# Patient Record
Sex: FEMALE | Race: BLACK OR AFRICAN AMERICAN | Employment: FULL TIME | URBAN - METROPOLITAN AREA
[De-identification: names, ages, dates, MRNs, and addresses within clinical notes are randomized per-mention and may not be internally consistent; named-entity substitution may affect disease eponyms.]

---

## 2017-03-28 ENCOUNTER — OFFICE VISIT (OUTPATIENT)
Dept: BEHAVIORAL/MENTAL HEALTH CLINIC | Age: 42
End: 2017-03-28

## 2017-03-28 VITALS
SYSTOLIC BLOOD PRESSURE: 135 MMHG | WEIGHT: 178 LBS | BODY MASS INDEX: 32.76 KG/M2 | HEART RATE: 82 BPM | HEIGHT: 62 IN | DIASTOLIC BLOOD PRESSURE: 85 MMHG

## 2017-03-28 DIAGNOSIS — F41.9 ANXIETY: ICD-10-CM

## 2017-03-28 DIAGNOSIS — F41.1 GENERALIZED ANXIETY DISORDER: Primary | ICD-10-CM

## 2017-03-28 RX ORDER — SERTRALINE HYDROCHLORIDE 50 MG/1
50 TABLET, FILM COATED ORAL DAILY
Qty: 30 TAB | Refills: 5 | Status: SHIPPED | OUTPATIENT
Start: 2017-03-28 | End: 2017-09-27 | Stop reason: SDUPTHER

## 2017-03-28 RX ORDER — PHENTERMINE HYDROCHLORIDE 37.5 MG/1
37.5 CAPSULE ORAL
COMMUNITY
End: 2018-03-27

## 2017-03-28 NOTE — PROGRESS NOTES
Psychiatric Outpatient Progress Note    Date: 3/28/2017  Account Number:  [id number]  Name: Kimmie Olson    Length of psychotherapy session: 20 minutes       SUBJECTIVE:   Kimmie Olson  is a 39 y.o.  female  patient presents for a therapy/psychopharmacological management appointment. She presents today without complaint and she denies any problems with the Zoloft. She is currently taking Phenteramine and Topamax for weight loss. She is looking forward to a trip to [facility] with her  and kids this spring. PFSHx: no work or familial challenges. ROS:   Appetite:good , Sleep: good ; A review of neurological, orthopedic, HEENT, Constitutional systems were within normal limits.     Response to Treatment: stable  Side Effects: none      Supportive/Cognitive/Reality-Oriented psychotherapy provided in regards to psychosocial stressors:   Current problems   Housing issues   Occupational issues- none   Academic issues   Legal issues   Medical issues   Interpersonal conflicts- none  Psychoeducation provided  Treatment plan reviewed with patient-including diagnosis and medications  Worked on issues of denial & effects of substance dependency/use      OBJECTIVE:                 Mental Status exam: WNL except for      Sensorium  oriented to time, place and person   Relations cooperative    Eye Contact    appropriate   Appearance:  age appropriate and casually dressed   Motor Behavior:  gait stable   Speech:  normal pitch and normal volume   Thought Process: within normal limits   Thought Content free of delusions and free of hallucinations   Suicidal ideations none   Homicidal ideations none   Mood:  euthymic   Affect:  euthymic   Memory recent  adequate   Memory remote:  adequate   Concentration:  adequate   Abstraction:  abstract   Insight:  good   Reliability good   Judgment:  good       Allergies   Allergen Reactions    Aspirin Swelling     Lip swelling, itching around mouth    Nsaids (Non-Steroidal Anti-Inflammatory Drug) Itching and Swelling        Current Outpatient Prescriptions   Medication Sig Dispense Refill    TOPIRAMATE (TOPAMAX PO) Take  by mouth.  phentermine 37.5 mg capsule Take 37.5 mg by mouth every morning.  sertraline (ZOLOFT) 50 mg tablet Take 1 Tab by mouth daily. Indications: GENERALIZED ANXIETY DISORDER, major depressive disorder 30 Tab 5    fluticasone (FLONASE) 50 mcg/actuation nasal spray 2 Sprays by Both Nostrils route daily.  cetirizine (ALLER-NANY) 10 mg tablet Take 10 mg by mouth daily. MEDICAL DECISION MAKING    Data: pertinent labs, imaging, medical records and diagnostic tests reviewed and incorporated in diagnosis and treatment plan    Diagnoses/ Problems:   1. Generalized anxiety disorder    Improved, stable  2. Insomnia   Improved, stable  Assessment:  Sue Willams  is a 39 y.o.  female patient presents with stable generalized anxiety disorder and insomnia. Risk Scoring- chronic illnesses and prescription drug management    Treatment Plan:  1. Medications:   Continue Zoloft 50mg    Orders Placed This Encounter    TOPIRAMATE (TOPAMAX PO)    phentermine 37.5 mg capsule    sertraline (ZOLOFT) 50 mg tablet           The following regarding medications was addressed:    (The risks and benefits of the proposed medication; the potential medication side effects ie    dry mouth, weight gain, GI upset, headache; patient given opportunity to ask questions)       Medication Changes/Adjustments:   Medications Discontinued During This Encounter   Medication Reason    VICTOZA 3-KOLE 0.6 mg/0.1 mL (18 mg/3 mL) sub-q pen Not A Current Medication    sertraline (ZOLOFT) 50 mg tablet Reorder      2. Provided supportive psychotherapy: addressed safety risk, recent stressors, provided validation and assisted with coping skills and problem solving  3.   Advised patient of elevated blood pressure, recc checking her bp at least twice a day for 1-2 weeks and keep a record. Advised to lose weight also  4.   Follow-up Disposition: Not on File

## 2017-09-27 ENCOUNTER — OFFICE VISIT (OUTPATIENT)
Dept: BEHAVIORAL/MENTAL HEALTH CLINIC | Age: 42
End: 2017-09-27

## 2017-09-27 VITALS
HEIGHT: 62 IN | WEIGHT: 166 LBS | BODY MASS INDEX: 30.55 KG/M2 | SYSTOLIC BLOOD PRESSURE: 120 MMHG | DIASTOLIC BLOOD PRESSURE: 84 MMHG | HEART RATE: 89 BPM

## 2017-09-27 DIAGNOSIS — F41.9 ANXIETY: ICD-10-CM

## 2017-09-27 DIAGNOSIS — F41.1 GENERALIZED ANXIETY DISORDER: Primary | ICD-10-CM

## 2017-09-27 RX ORDER — SERTRALINE HYDROCHLORIDE 50 MG/1
50 TABLET, FILM COATED ORAL DAILY
Qty: 30 TAB | Refills: 5 | Status: SHIPPED | OUTPATIENT
Start: 2017-09-27 | End: 2018-03-27 | Stop reason: SDUPTHER

## 2017-09-27 RX ORDER — SERTRALINE HYDROCHLORIDE 50 MG/1
50 TABLET, FILM COATED ORAL DAILY
Qty: 30 TAB | Refills: 5 | Status: SHIPPED | OUTPATIENT
Start: 2017-09-27 | End: 2017-09-27 | Stop reason: SDUPTHER

## 2017-09-27 NOTE — PROGRESS NOTES
Psychiatric Outpatient Progress Note    Date: 9/27/2017  Account Number:  [id number]  Name: Jake Valenzuela    Length of psychotherapy session: 20 minutes       SUBJECTIVE:   Jake Valenzuela  is a 39 y.o.  female  patient presents for a therapy/psychopharmacological management appointment. She reports that her mood has been stable, no significant anxiety or depression. She presents today without complaint and she denies any problems with the Zoloft. She is currently taking Phenteramine and Topamax for weight loss with limited success. Her doctor has recommended increased physical activity. Work and marriage, stable and fulfilling. PFSHx: no work or familial challenges. ROS:   Appetite:good , Sleep: good ; A review of neurological, orthopedic, HEENT, Constitutional systems were within normal limits.     Response to Treatment: stable  Side Effects: none      Supportive/Cognitive/Reality-Oriented psychotherapy provided in regards to psychosocial stressors:   Current problems   Housing issues   Occupational issues- none   Academic issues   Legal issues   Medical issues   Interpersonal conflicts- none  Psychoeducation provided  Treatment plan reviewed with patient-including diagnosis and medications  Worked on issues of denial & effects of substance dependency/use      OBJECTIVE:                 Mental Status exam: WNL except for      Sensorium  oriented to time, place and person   Relations cooperative    Eye Contact    appropriate   Appearance:  age appropriate and casually dressed   Motor Behavior:  gait stable   Speech:  normal pitch and normal volume   Thought Process: within normal limits   Thought Content free of delusions and free of hallucinations   Suicidal ideations none   Homicidal ideations none   Mood:  euthymic   Affect:  euthymic   Memory recent  adequate   Memory remote:  adequate   Concentration:  adequate   Abstraction:  abstract   Insight:  good   Reliability good   Judgment:  good Allergies   Allergen Reactions    Aspirin Swelling     Lip swelling, itching around mouth    Nsaids (Non-Steroidal Anti-Inflammatory Drug) Itching and Swelling        Current Outpatient Prescriptions   Medication Sig Dispense Refill    TOPIRAMATE (TOPAMAX PO) Take  by mouth.  phentermine 37.5 mg capsule Take 37.5 mg by mouth every morning.  sertraline (ZOLOFT) 50 mg tablet Take 1 Tab by mouth daily. Indications: GENERALIZED ANXIETY DISORDER, major depressive disorder 30 Tab 5    fluticasone (FLONASE) 50 mcg/actuation nasal spray 2 Sprays by Both Nostrils route daily.  cetirizine (ALLER-NANY) 10 mg tablet Take 10 mg by mouth daily. MEDICAL DECISION MAKING    Data: pertinent labs, imaging, medical records and diagnostic tests reviewed and incorporated in diagnosis and treatment plan    Diagnoses/ Problems:   1. Generalized anxiety disorder    Improved, stable  2. Insomnia   Improved, stable  Assessment:  Megha Rangel  is a 39 y.o.  female patient presents with stable generalized anxiety disorder and insomnia. Risk Scoring- chronic illnesses and prescription drug management    Treatment Plan:  1. Medications:   Continue Zoloft 50mg    No orders of the defined types were placed in this encounter. The following regarding medications was addressed:    (The risks and benefits of the proposed medication; the potential medication side effects ie    dry mouth, weight gain, GI upset, headache; patient given opportunity to ask questions)       Medication Changes/Adjustments: There are no discontinued medications. 2.  Provided supportive psychotherapy: addressed safety risk, recent stressors, provided validation and assisted with coping skills and problem solving  3. Advised patient of elevated blood pressure, recc checking her bp at least twice a day for 1-2 weeks and keep a record. Advised to lose weight also  4.   Follow-up Disposition: Not on File

## 2018-03-27 ENCOUNTER — OFFICE VISIT (OUTPATIENT)
Dept: BEHAVIORAL/MENTAL HEALTH CLINIC | Age: 43
End: 2018-03-27

## 2018-03-27 DIAGNOSIS — F41.9 ANXIETY: ICD-10-CM

## 2018-03-27 DIAGNOSIS — F41.1 GENERALIZED ANXIETY DISORDER: Primary | ICD-10-CM

## 2018-03-27 RX ORDER — SERTRALINE HYDROCHLORIDE 50 MG/1
50 TABLET, FILM COATED ORAL DAILY
Qty: 30 TAB | Refills: 5 | Status: SHIPPED | OUTPATIENT
Start: 2018-03-27 | End: 2018-09-26 | Stop reason: SDUPTHER

## 2018-03-27 NOTE — MR AVS SNAPSHOT
[address] [facility] [address] [place] [address] 
[phone] Patient: Abdon Hanley MRN: [id number] :1975 Visit Information Date & Time Provider Department Dept. Phone Encounter #  
 3/27/2018  2:00 PM Francine Veloz MD [id number] [facility] [phone] [id number] Upcoming Health Maintenance Date Due DTaP/Tdap/Td series (1 - Tdap) 1996 PAP AKA CERVICAL CYTOLOGY 1996 Influenza Age 5 to Adult 2017 Allergies as of 3/27/2018  Review Complete On: 2017 By: Rosie Rivas Severity Noted Reaction Type Reactions Aspirin  2013    Swelling Lip swelling, itching around mouth Nsaids (Non-steroidal Anti-inflammatory Drug)  2015    Itching, Swelling Current Immunizations  Never Reviewed No immunizations on file. Not reviewed this visit You Were Diagnosed With   
  
 Codes Comments Generalized anxiety disorder    -  Primary ICD-10-CM: F41.1 ICD-9-CM: 300.02 Anxiety     ICD-10-CM: F41.9 ICD-9-CM: 300.00 Vitals OB Status Smoking Status Having regular periods Never Smoker Preferred Pharmacy Pharmacy Name Phone [address], [facility] [address] Your Updated Medication List  
  
   
This list is accurate as of 3/27/18  2:30 PM.  Always use your most recent med list.  
  
  
  
  
 ALLER-NANY 10 mg tablet Generic drug:  cetirizine Take 10 mg by mouth daily. FLONASE 50 mcg/actuation nasal spray Generic drug:  fluticasone 2 Sprays by Both Nostrils route daily. sertraline 50 mg tablet Commonly known as:  ZOLOFT Take 1 Tab by mouth daily. Indications: Generalized Anxiety Disorder, major depressive disorder TOPAMAX PO Take  by mouth. Prescriptions Sent to Pharmacy Refills sertraline (ZOLOFT) 50 mg tablet 5 Sig: Take 1 Tab by mouth daily. Indications: Generalized Anxiety Disorder, major depressive disorder Class: Normal  
 Pharmacy: [address] [facility], [facility] [address] [facility] #: [phone] Route: Oral  
  
Introducing [facility]! Sylvie Davis introduces [employer] patient portal. Now you can access parts of your medical record, email your doctor's office, and request medication refills online. 1. In your internet browser, go to [email or web address] 2. Click on the First Time User? Click Here link in the Sign In box. You will see the New Member Sign Up page. 3. Enter your [employer] Access Code exactly as it appears below. You will not need to use this code after youve completed the sign-up process. If you do not sign up before the expiration date, you must request a new code. · [employer] Access Code: [email or web address] Expires: 6/25/2018  2:30 PM 
 
4. Enter the last four digits of your Social Security Number (xxxx) and Date of Birth (mm/dd/yyyy) as indicated and click Submit. You will be taken to the next sign-up page. 5. Create a [employer] ID. This will be your [employer] login ID and cannot be changed, so think of one that is secure and easy to remember. 6. Create a [employer] password. You can change your password at any time. 7. Enter your Password Reset Question and Answer. This can be used at a later time if you forget your password. 8. Enter your e-mail address. You will receive e-mail notification when new information is available in [address] 9. Click Sign Up. You can now view and download portions of your medical record. 10. Click the Download Summary menu link to download a portable copy of your medical information. If you have questions, please visit the Frequently Asked Questions section of the [employer] website.  Remember, [employer] is NOT to be used for urgent needs. For medical emergencies, dial 911. Now available from your iPhone and Android! Please provide this summary of care documentation to your next provider. Your primary care clinician is listed as [address]. If you have any questions after today's visit, please call [phone].

## 2018-03-27 NOTE — PROGRESS NOTES
Psychiatric Outpatient Progress Note    Date: 3/27/2018  Account Number:  [id number]  Name: Lana Paniagua    Length of psychotherapy session: 20 minutes       SUBJECTIVE:   Lana Paniagua  is a 43 y.o.  female  patient presents for a therapy/psychopharmacological management appointment. She reports that her mood has been stable, no significant anxiety or depression. She presents today without complaint and she denies any problems with the Zoloft. She is currently taking  Topamax for weight loss with limited success, no longer taking phenteramine. Work and marriage, stable and fulfilling. She is looking forward to summer vacation. PFSHx: no work or familial challenges. ROS:   Appetite:good , Sleep: good ; A review of neurological, orthopedic, HEENT, Constitutional systems were within normal limits.     Response to Treatment: stable  Side Effects: none      Supportive/Cognitive/Reality-Oriented psychotherapy provided in regards to psychosocial stressors:   Current problems   Housing issues   Occupational issues- none   Academic issues   Legal issues   Medical issues   Interpersonal conflicts- none  Psychoeducation provided  Treatment plan reviewed with patient-including diagnosis and medications  Worked on issues of denial & effects of substance dependency/use      OBJECTIVE:                 Mental Status exam: WNL except for      Sensorium  oriented to time, place and person   Relations cooperative    Eye Contact    appropriate   Appearance:  age appropriate and casually dressed   Motor Behavior:  gait stable   Speech:  normal pitch and normal volume   Thought Process: within normal limits   Thought Content free of delusions and free of hallucinations   Suicidal ideations none   Homicidal ideations none   Mood:  euthymic   Affect:  euthymic   Memory recent  adequate   Memory remote:  adequate   Concentration:  adequate   Abstraction:  abstract   Insight:  good   Reliability good   Judgment:  good Allergies   Allergen Reactions    Aspirin Swelling     Lip swelling, itching around mouth    Nsaids (Non-Steroidal Anti-Inflammatory Drug) Itching and Swelling        Current Outpatient Prescriptions   Medication Sig Dispense Refill    sertraline (ZOLOFT) 50 mg tablet Take 1 Tab by mouth daily. Indications: Generalized Anxiety Disorder, major depressive disorder 30 Tab 5    TOPIRAMATE (TOPAMAX PO) Take  by mouth.  fluticasone (FLONASE) 50 mcg/actuation nasal spray 2 Sprays by Both Nostrils route daily.  cetirizine (ALLER-NANY) 10 mg tablet Take 10 mg by mouth daily. MEDICAL DECISION MAKING    Data: pertinent labs, imaging, medical records and diagnostic tests reviewed and incorporated in diagnosis and treatment plan    Diagnoses/ Problems:   1. Generalized anxiety disorder    Improved, stable  2. Insomnia   Improved, stable  Assessment:  Ara Bey  is a 43 y.o.  female patient presents with stable generalized anxiety disorder and insomnia. Risk Scoring- chronic illnesses and prescription drug management    Treatment Plan:  1. Medications:   Continue Zoloft 50mg    Orders Placed This Encounter    sertraline (ZOLOFT) 50 mg tablet           The following regarding medications was addressed:    (The risks and benefits of the proposed medication; the potential medication side effects ie    dry mouth, weight gain, GI upset, headache; patient given opportunity to ask questions)       Medication Changes/Adjustments:   Medications Discontinued During This Encounter   Medication Reason    phentermine 37.5 mg capsule Not A Current Medication    sertraline (ZOLOFT) 50 mg tablet Reorder      2. Provided supportive psychotherapy: addressed safety risk, recent stressors, provided validation and assisted with coping skills and problem solving  3. Advised patient of elevated blood pressure, recc checking her bp at least twice a day for 1-2 weeks and keep a record.  Advised to lose weight also  4. Follow-up Disposition:  Return in about 6 months (around 9/27/2018).

## 2018-09-26 ENCOUNTER — OFFICE VISIT (OUTPATIENT)
Dept: BEHAVIORAL/MENTAL HEALTH CLINIC | Age: 43
End: 2018-09-26

## 2018-09-26 VITALS
HEART RATE: 86 BPM | BODY MASS INDEX: 28.52 KG/M2 | SYSTOLIC BLOOD PRESSURE: 137 MMHG | DIASTOLIC BLOOD PRESSURE: 88 MMHG | HEIGHT: 62 IN | WEIGHT: 155 LBS

## 2018-09-26 DIAGNOSIS — F41.1 GENERALIZED ANXIETY DISORDER: Primary | ICD-10-CM

## 2018-09-26 DIAGNOSIS — F41.9 ANXIETY: ICD-10-CM

## 2018-09-26 RX ORDER — SERTRALINE HYDROCHLORIDE 50 MG/1
50 TABLET, FILM COATED ORAL DAILY
Qty: 30 TAB | Refills: 5 | Status: SHIPPED | OUTPATIENT
Start: 2018-09-26 | End: 2019-01-09 | Stop reason: SDUPTHER

## 2018-09-28 NOTE — PROGRESS NOTES
Psychiatric Outpatient Progress Note    Date: 9/28/2018  Account Number:  [id number]  Name: Byron Litten    Length of psychotherapy session: 20 minutes       SUBJECTIVE:   Byron Litten  is a 43 y.o.  female  patient presents for a therapy/psychopharmacological management appointment. She reports that her mood has been stable, no significant anxiety or depression. She presents today without complaint and she denies any problems with the Zoloft. She is currently taking  Topamax for weight loss with limited success, no longer taking phenteramine. 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 PFSHx: no work or familial challenges. ROS:   Appetite:good , Sleep: good ; A review of neurological, orthopedic, HEENT, Constitutional systems were within normal limits.     Response to Treatment: stable  Side Effects: none      Supportive/Cognitive/Reality-Oriented psychotherapy provided in regards to psychosocial stressors:   Current problems   Housing issues   Occupational issues- none   Academic issues   Legal issues   Medical issues   Interpersonal conflicts- none  Psychoeducation provided  Treatment plan reviewed with patient-including diagnosis and medications  Worked on issues of denial & effects of substance dependency/use      OBJECTIVE:                 Mental Status exam: WNL except for      Sensorium  oriented to time, place and person   Relations cooperative    Eye Contact    appropriate   Appearance:  age appropriate and casually dressed   Motor Behavior:  gait stable   Speech:  normal pitch and normal volume   Thought Process: within normal limits   Thought Content free of delusions and free of hallucinations   Suicidal ideations none   Homicidal ideations none   Mood:  euthymic   Affect:  euthymic   Memory recent  adequate   Memory remote:  adequate   Concentration:  adequate   Abstraction:  abstract   Insight:  good   Reliability good   Judgment:  good       Allergies Allergen Reactions    Aspirin Swelling     Lip swelling, itching around mouth    Nsaids (Non-Steroidal Anti-Inflammatory Drug) Itching and Swelling        Current Outpatient Prescriptions   Medication Sig Dispense Refill    metformin HCl (METFORMIN PO) Take 1,000 mg by mouth two (2) times a day.  sertraline (ZOLOFT) 50 mg tablet Take 1 Tab by mouth daily. Indications: Generalized Anxiety Disorder, major depressive disorder 30 Tab 5    TOPIRAMATE (TOPAMAX PO) Take 50 mg by mouth two (2) times a day.  fluticasone (FLONASE) 50 mcg/actuation nasal spray 2 Sprays by Both Nostrils route daily.  cetirizine (ALLER-NANY) 10 mg tablet Take 10 mg by mouth daily. MEDICAL DECISION MAKING    Data: pertinent labs, imaging, medical records and diagnostic tests reviewed and incorporated in diagnosis and treatment plan    Diagnoses/ Problems:   1. Generalized anxiety disorder    Improved, stable  2. Insomnia   Improved, stable  Assessment:  Derrek Caldera  is a 43 y.o.  female patient presents with stable generalized anxiety disorder and insomnia. Risk Scoring- chronic illnesses and prescription drug management    Treatment Plan:  1. Medications:   Continue Zoloft 50mg    Orders Placed This Encounter    metformin HCl (METFORMIN PO)    sertraline (ZOLOFT) 50 mg tablet           The following regarding medications was addressed:    (The risks and benefits of the proposed medication; the potential medication side effects ie    dry mouth, weight gain, GI upset, headache; patient given opportunity to ask questions)       Medication Changes/Adjustments:   Medications Discontinued During This Encounter   Medication Reason    sertraline (ZOLOFT) 50 mg tablet Reorder      2. Provided supportive psychotherapy: addressed safety risk, recent stressors, provided validation and assisted with coping skills and problem solving  3.   Advised patient of elevated blood pressure, recc checking her bp at least twice a day for 1-2 weeks and keep a record. Advised to lose weight also  4. Follow-up Disposition:  Return in about 3 months (around 12/26/2018).

## 2019-01-09 ENCOUNTER — OFFICE VISIT (OUTPATIENT)
Dept: BEHAVIORAL/MENTAL HEALTH CLINIC | Age: 44
End: 2019-01-09

## 2019-01-09 VITALS
HEIGHT: 62 IN | BODY MASS INDEX: 29.44 KG/M2 | WEIGHT: 160 LBS | SYSTOLIC BLOOD PRESSURE: 129 MMHG | DIASTOLIC BLOOD PRESSURE: 79 MMHG | HEART RATE: 94 BPM

## 2019-01-09 DIAGNOSIS — F41.1 GENERALIZED ANXIETY DISORDER: ICD-10-CM

## 2019-01-09 DIAGNOSIS — F41.9 ANXIETY: ICD-10-CM

## 2019-01-09 NOTE — PROGRESS NOTES
Psychiatric Outpatient Progress Note    Date: 1/9/2019  Account Number:  [id number]  Name: Italo Colon    Length of psychotherapy session: 20 minutes       SUBJECTIVE:   Italo Colon  is a 37 y.o.  female  patient presents for a therapy/psychopharmacological management appointment. She reports that her mood has been stable, no significant anxiety or depression. She presents today without complaint and she denies any problems with the Zoloft. She is currently taking  Topamax for weight loss with limited success, no longer taking phenteramine. 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 PFSHx: no work or familial challenges. ROS:   Appetite:good , Sleep: good ; A review of neurological, orthopedic, HEENT, Constitutional systems were within normal limits.     Response to Treatment: stable  Side Effects: none      Supportive/Cognitive/Reality-Oriented psychotherapy provided in regards to psychosocial stressors:   Current problems   Housing issues   Occupational issues- none   Academic issues   Legal issues   Medical issues   Interpersonal conflicts- none  Psychoeducation provided  Treatment plan reviewed with patient-including diagnosis and medications  Worked on issues of denial & effects of substance dependency/use      OBJECTIVE:                 Mental Status exam: WNL except for      Sensorium  oriented to time, place and person   Relations cooperative    Eye Contact    appropriate   Appearance:  age appropriate and casually dressed   Motor Behavior:  gait stable   Speech:  normal pitch and normal volume   Thought Process: within normal limits   Thought Content free of delusions and free of hallucinations   Suicidal ideations none   Homicidal ideations none   Mood:  euthymic   Affect:  euthymic   Memory recent  adequate   Memory remote:  adequate   Concentration:  adequate   Abstraction:  abstract   Insight:  good   Reliability good   Judgment:  good       Allergies   Allergen Reactions    Aspirin Swelling     Lip swelling, itching around mouth    Nsaids (Non-Steroidal Anti-Inflammatory Drug) Itching and Swelling        Current Outpatient Medications   Medication Sig Dispense Refill    metformin HCl (METFORMIN PO) Take 1,000 mg by mouth two (2) times a day.  sertraline (ZOLOFT) 50 mg tablet Take 1 Tab by mouth daily. Indications: Generalized Anxiety Disorder, major depressive disorder 30 Tab 5    TOPIRAMATE (TOPAMAX PO) Take 50 mg by mouth two (2) times a day.  fluticasone (FLONASE) 50 mcg/actuation nasal spray 2 Sprays by Both Nostrils route daily.  cetirizine (ALLER-NANY) 10 mg tablet Take 10 mg by mouth daily. MEDICAL DECISION MAKING    Data: pertinent labs, imaging, medical records and diagnostic tests reviewed and incorporated in diagnosis and treatment plan    Diagnoses/ Problems:   1. Generalized anxiety disorder    Improved, stable  2. Insomnia   Improved, stable  Assessment:  Parth Johnson  is a 37 y.o.  female patient presents with stable mood. Patient aware of this writer's transition from the practice and she has decided to transition care to her pcp, which this writer supports. Mood and meds stable for many years. Risk Scoring- chronic illnesses and prescription drug management    Treatment Plan:  1. Medications:   Continue Zoloft 50mg    No orders of the defined types were placed in this encounter. The following regarding medications was addressed:    (The risks and benefits of the proposed medication; the potential medication side effects ie    dry mouth, weight gain, GI upset, headache; patient given opportunity to ask questions)       Medication Changes/Adjustments: There are no discontinued medications. 2.  Provided supportive psychotherapy: addressed safety risk, recent stressors, provided validation and assisted with coping skills and problem solving  3.   Advised patient of elevated blood pressure, recc checking her bp at least twice a day for 1-2 weeks and keep a record. Advised to lose weight also  4.   Follow-up Disposition: Not on File

## 2019-01-19 RX ORDER — SERTRALINE HYDROCHLORIDE 50 MG/1
50 TABLET, FILM COATED ORAL DAILY
Qty: 30 TAB | Refills: 5 | Status: SHIPPED | OUTPATIENT
Start: 2019-01-19

## 2024-05-07 ENCOUNTER — HOSPITAL ENCOUNTER (OUTPATIENT)
Facility: HOSPITAL | Age: 49
Discharge: HOME | End: 2024-05-10
Attending: OBSTETRICS & GYNECOLOGY
Payer: COMMERCIAL

## 2024-05-07 DIAGNOSIS — Z91.89 OTHER SPECIFIED PERSONAL RISK FACTORS, NOT ELSEWHERE CLASSIFIED: ICD-10-CM

## 2024-05-07 PROCEDURE — A9579 GAD-BASE MR CONTRAST NOS,1ML: HCPCS | Performed by: OBSTETRICS & GYNECOLOGY

## 2024-05-07 PROCEDURE — 6360000004 HC RX CONTRAST MEDICATION: Performed by: OBSTETRICS & GYNECOLOGY

## 2024-05-07 PROCEDURE — C8908 MRI W/O FOL W/CONT, BREAST,: HCPCS

## 2024-05-07 PROCEDURE — 2580000003 HC RX 258: Performed by: OBSTETRICS & GYNECOLOGY

## 2024-05-07 RX ORDER — 0.9 % SODIUM CHLORIDE 0.9 %
100 INTRAVENOUS SOLUTION INTRAVENOUS ONCE
Status: COMPLETED | OUTPATIENT
Start: 2024-05-07 | End: 2024-05-07

## 2024-05-07 RX ADMIN — GADOTERIDOL 15 ML: 279.3 INJECTION, SOLUTION INTRAVENOUS at 13:41

## 2024-05-07 RX ADMIN — SODIUM CHLORIDE 100 ML: 9 INJECTION, SOLUTION INTRAVENOUS at 13:41

## 2024-05-21 RX ORDER — LIDOCAINE HYDROCHLORIDE 10 MG/ML
20 INJECTION, SOLUTION EPIDURAL; INFILTRATION; INTRACAUDAL; PERINEURAL ONCE
Status: COMPLETED | OUTPATIENT
Start: 2024-05-22 | End: 2024-05-22

## 2024-05-21 RX ORDER — LIDOCAINE HYDROCHLORIDE AND EPINEPHRINE 10; 10 MG/ML; UG/ML
20 INJECTION, SOLUTION INFILTRATION; PERINEURAL ONCE
Status: COMPLETED | OUTPATIENT
Start: 2024-05-22 | End: 2024-05-22

## 2024-05-22 ENCOUNTER — HOSPITAL ENCOUNTER (OUTPATIENT)
Facility: HOSPITAL | Age: 49
Discharge: HOME | End: 2024-05-25
Attending: OBSTETRICS & GYNECOLOGY
Payer: COMMERCIAL

## 2024-05-22 ENCOUNTER — HOSPITAL ENCOUNTER (OUTPATIENT)
Facility: HOSPITAL | Age: 49
Discharge: HOME | End: 2024-05-25
Attending: OBSTETRICS & GYNECOLOGY

## 2024-05-22 DIAGNOSIS — R92.8 ABNORMAL MRI, BREAST: ICD-10-CM

## 2024-05-22 PROCEDURE — 88305 TISSUE EXAM BY PATHOLOGIST: CPT

## 2024-05-22 PROCEDURE — 2580000003 HC RX 258: Performed by: OBSTETRICS & GYNECOLOGY

## 2024-05-22 PROCEDURE — 2500000003 HC RX 250 WO HCPCS: Performed by: OBSTETRICS & GYNECOLOGY

## 2024-05-22 PROCEDURE — A9579 GAD-BASE MR CONTRAST NOS,1ML: HCPCS | Performed by: OBSTETRICS & GYNECOLOGY

## 2024-05-22 PROCEDURE — 2709999900 MRI BIOPSY BREAST W LOC DEVICE RIGHT 1ST LESION

## 2024-05-22 PROCEDURE — 77065 DX MAMMO INCL CAD UNI: CPT

## 2024-05-22 PROCEDURE — 6360000004 HC RX CONTRAST MEDICATION: Performed by: OBSTETRICS & GYNECOLOGY

## 2024-05-22 RX ORDER — 0.9 % SODIUM CHLORIDE 0.9 %
100 INTRAVENOUS SOLUTION INTRAVENOUS ONCE
Status: COMPLETED | OUTPATIENT
Start: 2024-05-22 | End: 2024-05-22

## 2024-05-22 RX ADMIN — GADOTERIDOL 15 ML: 279.3 INJECTION, SOLUTION INTRAVENOUS at 08:31

## 2024-05-22 RX ADMIN — LIDOCAINE HYDROCHLORIDE,EPINEPHRINE BITARTRATE 20 ML: 10; .01 INJECTION, SOLUTION INFILTRATION; PERINEURAL at 08:15

## 2024-05-22 RX ADMIN — SODIUM CHLORIDE 100 ML: 9 INJECTION, SOLUTION INTRAVENOUS at 08:30

## 2024-05-22 RX ADMIN — LIDOCAINE HYDROCHLORIDE 20 ML: 10 INJECTION, SOLUTION EPIDURAL; INFILTRATION; INTRACAUDAL; PERINEURAL at 08:12

## 2024-05-22 NOTE — PROGRESS NOTES
Patient tolerated right breast biopsy well with scant bleeding. Biopsy site was bandaged using steri strips, gauze and tegaderm, ice pack placed on site. Discharge instructions were reviewed with the patient who expresses understanding. She was provided with a written copy as well. Advised patient that results should be available by Friday or possibly Monday, and that she will receive a phone call with these results. Encouraged her to call with any questions or concerns.

## 2024-05-23 ENCOUNTER — TELEPHONE (OUTPATIENT)
Facility: HOSPITAL | Age: 49
End: 2024-05-23

## 2024-05-23 NOTE — TELEPHONE ENCOUNTER
Called pt with benign pathology from breast biopsy 5/22/24. She was aware of result having seen it in [facility]. Relayed that concordance was verified with Dr. Zayas, and she can return to routine breast imaging from here. She voiced understanding of all the above.

## 2025-05-23 ENCOUNTER — TRANSCRIBE ORDERS (OUTPATIENT)
Facility: HOSPITAL | Age: 50
End: 2025-05-23

## 2025-05-23 DIAGNOSIS — Z91.89 OTHER SPECIFIED PERSONAL RISK FACTORS, NOT ELSEWHERE CLASSIFIED: Primary | ICD-10-CM
